# Patient Record
Sex: MALE | Race: WHITE | Employment: OTHER | URBAN - METROPOLITAN AREA
[De-identification: names, ages, dates, MRNs, and addresses within clinical notes are randomized per-mention and may not be internally consistent; named-entity substitution may affect disease eponyms.]

---

## 2021-01-01 ENCOUNTER — HOSPITAL ENCOUNTER (OUTPATIENT)
Dept: PHYSICAL THERAPY | Age: 56
Discharge: HOME | End: 2021-08-11
Payer: COMMERCIAL

## 2021-01-01 ENCOUNTER — HOSPITAL ENCOUNTER (OUTPATIENT)
Dept: PHYSICAL THERAPY | Age: 56
End: 2021-01-01
Payer: COMMERCIAL

## 2021-01-01 ENCOUNTER — HOSPITAL ENCOUNTER (OUTPATIENT)
Dept: PHYSICAL THERAPY | Age: 56
Discharge: HOME | End: 2021-07-26
Payer: COMMERCIAL

## 2021-01-01 PROCEDURE — 92526 ORAL FUNCTION THERAPY: CPT

## 2021-01-01 PROCEDURE — 92610 EVALUATE SWALLOWING FUNCTION: CPT

## 2021-06-15 PROBLEM — Z98.890 STATUS POST NECK DISSECTION: Status: ACTIVE | Noted: 2021-01-01

## 2021-06-22 PROBLEM — Z48.89 POSTOPERATIVE OBSERVATION: Status: ACTIVE | Noted: 2021-01-01

## 2021-06-23 PROBLEM — E07.9 THYROID MASS: Status: ACTIVE | Noted: 2021-01-01

## 2021-07-26 NOTE — PROGRESS NOTES
[facility] [address], [address] [facility] 83,[address] 200   [place], 70 [facility]  [phone] phone  [phone] fax               Plan of Care/ Statement of Necessity for Speech Therapy Services     Patient name: Hali Vargas Start of Care: 2021   Referral source: Rita Phillip MD : 1965    Medical Diagnosis: Dysphagia [R13.10]  Payor: Juliet Junior / Plan: Jesse Nazario / Product Type: HMO /  Onset Date: 21    Treatment Diagnosis: R 13.13   Prior Hospitalization: see medical history Provider#: [id number]   Medications: Verified on Patient summary List    Comorbidities: allergic rhinitis; HTN; Diabetes; Sleep Apnea; sinusitis; R Neck mass; s/p neck dissection and total thyroidectomy d/t Metastatic papillary Ca of thyroid    Prior Level of Function: All aspects of speech/ language, cognition previously reported WNLS; change in voice noted prior to surgery and dysphagia c/b globus sensation with solids prior to surgery, per pt report    The Plan of Care and following information is based on the information from the initial evaluation. Assessment/ key information: This 62 y/o male was referred to OP ST by ENT s/p R neck mass excision 6/15/21 and total thyroidectomy on 21d/t metastatic papillary Ca. Pt reports increasing dysphagia  (solids/ globus) and change in voice prior to surgery. Per chart review, s/p surgery there were concerns of RLN injury and pt warranted re-intubation d/t laryngeal spasm. He was seen by acute SLP at bedside on 21 in which pt presented with mild-moderate pharyngeal dysphagia. He is scheduled to have an MBS completed on 21 at [address] [place] [address].     Today a formal bedside swallow evaluation was conducted per MD trevor/tx orders. Pt reports both swallowing and voice are a concern, but would like to address swallowing first. Pt is scheduled for voice eval on 21. Pt A&Ox4 and able to follow commands, as well as pertinent PMHX. Speech/voice: speech intact, but with moderate-severe dysphonia c/b breathiness, hoarseness and intermittent stidor. Pt stated, \"I know it sounds like I can't breathe, but I'm fine, It just sounds like it\". Oral mech examination revealed structures functional for speech and deglutition. Pt observed with thin and nectar/mildly thick liquids via single sips  with timely swallow initiation, but immediate coughing. Initially, improved tolerance presented with Nectar thick liquids when utilizing chin tuck and double swallow, however, I suspect pharyngeal residues, as after 3rd swallow increasing immediate and delayed cough occurring. Head turn was also attempted, but worsened dysphagia symptoms. Pt consumed honey/moderately thick liquids  With double swallow demo'ing no overt s/sx of aspiration. He demo'd tolerance of pudding with timely oral prep and swallow initiation. + rotary chew and thorough oral clearance was observed with Regular  solids; delayed throat clearing noted s/p swallow of solids. Across al consistencies, pt exhibited significantly reduced laryngeal elevation to palpation, and benefited from double swallows. Based on clinical findings, Pt exhibits a moderate pharyngeal dysphagia and is at risk for aspiration. Pt would further benefit from an MBS to further assess pharyngeal swallow, which is scheduled for 8/5/21      Pt educated with regard to s/sx aspiration, aspiration risk, diet recs, comp strategies, purpose/benefit of MBS, how to thicken liquids and benefit, positioning,  and role of SLP. Pt able to verbalize understanding.      SLP RECS:  IDDIS 6 and 3: Soft/Bite sized solids with Honey/ moderately thick liquids using slow rate and double swallows with standard aspiration precautions.    - MBS to be completed  -Voice eval to be completed   -initiate ST    Problem List:      []aphasic  []dysarthric  [x]dysphagic       []alexic  []agraphic  [x]dysphonia       []dysfluency []Cognitive-Linguistic Disorder       []other   Treatment Plan may include any combination of the following: Dysphagia Treatment, Voice Treatment, Treatment of Swallowing and Patient Education      Patient / Family readiness to learn indicated by: asking questions, trying to perform skills and interest    Persons(s) to be included in education:   patient (P)    Barriers to Learning/Limitations: yes;  physical    Patient Goal (s): improve QOL    Patient Self Reported Health Status: good    Rehabilitation Potential: good    Short Term Goals: To be accomplished in 4 weeks  1. Patient will complete laryngeal exercises (including Suzy maneuver, Mendelsohn maneuver, modified Shaker with CTAR ball, hard /k/ in isolation, effortful swallow, supraglottic swallow, and super supraglottic swallow,etc.), with min cues in 3/3 sessions in order to improve the strength/ agility/efficiency of his swallow to decrease laryngeal stasis and risk for aspiration. 2. Patient will recall/demonstrate aspiration precautions and safe swallowing strategies with 100% acc when provided with Bridgette in 3/3 sessions (small sips/bites; double swallow;  slow rate; oral care 2-3x daily; Sit upright at 90 degree angle during po trials and for 30 minute post po intake)  to decrease the reported incidences of dysphagia and s/sx of aspiration. 3. Patient will tolerate trials of  Nectar thick liquids using comp strategies without s/sx of aspiration/ penetration when provided with Bridgette in 3/3 sessions. 4.The patient will correctly thicken liquids to honey/moderately thick viscosity with 100% acc I in 3/3 sessions to reduce risk for aspiration PNA. 5. Patient will recall/ demonstrate elements warranted to participate in [facility] Protocol with aggressive oral care in 3/3 sessions in order to implement in conjunction with restorative swallowing exercises to improve QOL.     6. Pt will participate in objective modified barium swallow study to establish baseline and furhter assess pharyngeal swallow. Long Term Goals: To be accomplished in 4 weeks   1. Patient will consume a  Soft/bite sized and Nectar thick liquid diet utilizing compensatory swallowing strategies with yeni in 4/5 trials  without s/sx of aspiration/ penetration, as confirmed by MBS to promote QOL, enhance hydration/ nutrition status, and reduce risk of aspiration. Frequency / Duration: Patient to be seen  2 times per week for 4 weeks:  Complete Dysphonia eval; MBS to be completed    Patient/ Caregiver education and instruction: Diagnosis, prognosis, Swallowing Precautions, Diet Recs, Purpose of MBS, Compensatory Techniques and Pt/SLP mutually establish POC. Certification Period: 7/26/21--8/23/21    ANGELINA Méndez 7/26/2021 10:16 AM  MA, CCC-SLP  Speech-Language Pathologist    ________________________________________________________________________    I certify that the above Therapy Services are being furnished while the patient is under my care. I agree with the treatment plan and certify that this therapy is necessary.     Physician's Signature:____________Date:_________TIME:________    [employer], Date and Time must be completed for valid certification **      Please sign and return to In [address] [place] [address]  [phone] phone  [phone] fax     Thank you

## 2021-07-26 NOTE — PROGRESS NOTES
ST DAILY TREATMENT NOTE    Patient Name: Paz Mendoza  Date:2021  : 1965  [x]  Patient  Verified  Payor: Rosi Escobedo / Plan: Elsy Carmen / Product Type: HMO /   In time:8:45  Out time: 932  Total Treatment Time (min): 52  Visit #: 1 of 8    SUBJECTIVE  Pain Level (0-10 scale): 0    Any medication changes, allergies to medications, adverse drug reactions, diagnosis change, or new procedure performed?: [] No    [] Yes (see summary sheet for update)  Subjective functional status/changes:   [] No changes reported  This 64 y/o male was referred to OP ST by ENT s/p R neck mass excision 6/15/21 and total thyroidectomy on 21d/t metastatic papillary Ca. Pt reports increasing dysphagia  (solids/ globus) and change in voice prior to surgery. Per chart review, s/p surgery there were concerns of RLN injury and pt warranted re-intubation d/t laryngeal spasm. He was seen by acute SLP at bedside on 21 in which pt presented with mild-moderate pharyngeal dysphagia. He is scheduled to have an MBS completed on 21 at [facility].    Date of Onset: 21  Social History: ; has 2 daughters who reside with him. 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 Prior Functional level: All aspects of speech/ language, cognition previously reported WNLS; change in voice noted prior to surgery and dysphagia c/b globus sensation with solids prior to surgery, per pt report      Radiology: MBS planned for 21 @ [facility]  Current diet: Regular/ thin    positionin  Mental Status:  [x]alert []lethargic []confused  Orientation: []person []place []time [x]situation  [facility] Directions: []1-step [x]2-step [x]3-step [x]complex  Motivation: []excellent []good  []fair  []poor   Barriers to learning: [x]none []aphasia []? cognition []lethargy/motivation [][place]   []?vision []language []Fatigue/pain []psych factors compensate with:   Dentition: [x]normal []abnormal []edentulous []dentures   Respiratory Status: [x]WFL []SOB []O2 L/min:  []NC []Mask               []Trach Tube:                     []Excess secretions  Lips:  [x]Symmetrical []asymmetrical  Retraction [x]WFL  []?min []?mod []?max  Protrusion [x]WFL  []?min []?mod []?max  Strength [x]WFL  []?min []?mod []?max  Puff  []WFL  []?min []?mod []?max      Tongue:  [x]Symmetrical []asymmetrical  Protrusion [x]WFL  []?min []?mod []?max  Elevation [x]WFL  []?min []?mod []?max  Depression [x]WFL  []?min []?mod []?max  Lateralization [x]WFL  []?min []?mod []?max  Strength [x]WFL  []?min []?mod []?max    Velum:  [x]Symmetrical []asymmetrical  Gag Reflex:  []Present []absent [x]DNT  Sensation:  []Intact [x]Diminished  Specify: suspected       Voice:  []Normal [x]Hoarse []harsh  [x]Breathy []Hypernasal []hyponasal  []Gurgly Other:   Swallow:  [x]Volitional []absent  [x]Reflexive []absent  Cough   Strength : []WNL [x]Diminished  [x]Volitional []absent  [x]Reflexive []absent        OBJECTIVE    OP SWALLOW EVALUATION    Observation of Swallow:  Thin Nectar/  Mildly Honey/  Moderately Pudding   Solids   Other  Mixed consistency canned fruit cocktail   Oral Phase      DNT   Anterior loss of bolus  (decreased labial seal [])         Decreased bolus formation  (?lingual ROM/Coord[])         Increased mastication         Increased oral transit time  (?A-P bolus transit[])         Abnormal chewing         Tongue pumping/tremors         Pocketing  (?labial/buccal tension/lingual control)         Other         Oral Pharyngeal Phase         Delayed swallow initiation         Absent swallow         Stasis on lingual surface  (?lingual movement)         Adherence to hard palate   (? lingual elevation)         Pharyngeal Phase         Multiple swallows  (residue in pharynx []) X X X X X    Coughing post swallow X X   +/- chin tuck  & head turn           Throat clear post swallow         Wet vocal quality  (residue on vocal cords [])         Reduced laryngeal elevation X X X X X    Nasal Regurgitation  (? velopharyngeal closure)         Other           [] No symptoms of dysphagia evidenced  [x] Symptoms of dysphagia observed  [x] Patient at risk for aspiration  [] Other:     Recommendations:  Diet:  [] NPO    [] Pureed [] Minced/Moist  [x] Soft/Bite Sized  [] Easy to Chew [] Regular  Liquids: [] Water  [] Thin [] Slightly Thick [] Mildly Thick/Nectar  [x] Moderately Thick/Honey  [] Extremely Thick/pudding  Honeythick    Presentation: [x] Cup    [x] Spoon [] Straw [] Alternate liquids and solids  Monitor: [x] Sitting up at 90 deg [] Reclined to:  [] Head turned to:    [] Chin tuck  [] Head tilt to:      [x] Seated upright post meals (min):> 30  Document: [x] Coughing [x] Temperatures [] Lung Sounds    Videoflouroscopy: [x] Yes [] No  Dysphagia Treatment: [x] Yes [] No Sessions per week: 2x weekly   Other:    Remediation Techniques:  C = Compensatory techniques to use during meal      F = Facilitation/treatment techniques by SLP    [x] Supraglottic Swallow (c,f)    [] Oral motor exercises (f)  [x] Super-supraglottic swallow (c,f)   [] Labial closure  [] Compensations for pocketing (c)   [] Lingual elevation  [] Sweep mouth with tongue    [] Lingual lateralization  [] Sweep mouth with finger    [] Lingual anterior-posterior  [] External pressure to check   [x] Lingual base of tongue  [] Rinse mouth/expel after meal   [x] Vocal Fold Exercises (f)  [] Alternate liquid swallows every _ bites (c)  [x] Falsetto/laryngeal elevation exercises (f)  [] Discourage liquid wash between bites (c)  [] Thermal application (c,f)  [x] Multiple swallows     [] Sour bolus (f)  [x] Patient needs cues     [] Cold bolus (f)  [] Patient does not need cues   [x] Pharyngeal exercise (f)  [x] Mendelsohn Maneuver (c,f)    [] Breath hold  [] Encourage/stimulate lip closure (c)   [x] Effortful Swallow (c,f)  [] Empty mouth before next bite (c)   [] Tongue base retraction  [x] Cue patient to slow down (c)    [x] Tongue hold  [x] Encourage coughing (c)    [x] Laryngeal closure  []Chin tuck (c)  []Head turn  (c)  []Head turn, chin tuck (c)    Patient/Caregiver instruction/education: [x] Review HEP    [] Progressed/Changed    HEP/Handouts given:  IDDSI 3 and 6 handouts;  Thicken Up Clear Handout    Pain Level (0-10 scale) post treatment: 0  ASSESSMENT  [x]  See Plan of Care       PLAN  [x]  Upgrade activities as tolerated     [x]  Continue plan of care  []  Discharge due to:__  [x] Other: Tx initiated with pt education and handouts provided    ANGELINA Ross 7/26/2021  8:56 AM  MA, [id number] [facility]  Speech-Language Pathologist

## 2021-08-11 NOTE — PROGRESS NOTES
ST DAILY TREATMENT NOTE    Patient Name: Rubi Ramos  Date:2021  : 1965  [x]  Patient  Verified  Payor: Payor: DUNIA STARK / Plan: Jen Lazo / Product Type: HMO /   In time:850 Out time:930   Total Treatment Time (min): 40  Visit #: 2 of 8    Treatment Diagnosis: Dysphagia [R13.10]    SUBJECTIVE  Pain Level (0-10 scale): 0  Any medication changes, allergies to medications, adverse drug reactions, diagnosis change, or new procedure performed?: [] No    [x] Yes (see summary sheet for update):   *21: Oral Motor Assessment: No facial asymmetry at rest.  Natural dentition in poor condition with numerous missing teeth. Lingual ROM/strength WFL on the left/right. Labial ROM WFL on left/right for protrusion and retraction.      Motor Speech/Voice Production: Connected speech was adequate for rate, rhythm, and precision. However, vocal quality was breathy. Comprehensibility was judged 70% by unfamiliar listener. In addition, patient demonstrated a stridor when breathing. ENT reported stated that patient had bilateral vocal cord paralysis in the paramedian position.     Procedure: The study was completed with the patient in the lateral plane. The following consistencies were assessed: thin liquid via cup; nectar thick liquid via cup; honey thick liquid via cup; puree; soft solid; solid.      Oral Phase:   Bolus acceptance and retrieval were satisfactory. No consistent anterior loss occurred. Bolus cohesion was intact. Mastication was mildly prolonged, however bolus preparation and transport were effective in clearing oral cavity completely. No stasis accumulated along the lingual dorsum.     Pharyngeal Phase:  Swallow onset was timely , however incomplete epiglottic inversion, and diminished hyolaryngeal elevation were evident. No retention accumulated within the pharyngeal recesses.   Due to reduced hyolaryngeal elevation  and incomplete epiglottic seal, aspiration occurred with thin liquid and laryngeal penetration with nectar thick liquid. Patient did have a delayed cough in response to this airway violation. No aspiration or penetration was observed with trials of honey thick liquid, puree, soft solid and solid consistencies during this MBSS.      Esophageal Phase: In lateral plane for non-diagnostic screen of esophagus, no deficits were apparent. Please see radiologists report for further details.      Impressions: Moderate to severe pharyngeal dysphagia c/b diminished magnitude of pharyngeal response. Dysphagia resulted in aspiration of thin liquid via cup and laryngeal penetration with nectar thick liquid. Based upon this MBSS, recommend Regular Diet with Honey Thick Liquid as tolerated by patient. Patient will benefit from ST f/u for dysphagia tx targeting tongue base retraction, hyolaryngeal elevation, and pharyngeal strengthening as well respiratory retraining. Recommend repeat MBSS in 4-6 weeks per clinical gains.      RECOMMENDATIONS:     1. Diet: Regular and Honey-Thick as tolerated by patient     2. Aspiration Precautions: Awake and alert, May feed self independently, Seated upright, Slow rate, Small bites/sips, No straws, Remain upright at least 30 minutes after meals, If not contraindicated, crush meds and give with puree and Meticulous oral care     3. Outpatient ST for dysphgia     4. Monitor closely for s.s of aspiration or worsening dysphagia. Immediatly alert physician if patient exhibits changes in swallowing. *8/10/21: Dr Santiago Odell- Pt seen in office for f/u. Pt reports MD will likely have a trach placed. He presents today with much more audible breathing. He has a f/u appointment in office  8/16 @ 830.           Subjective functional status/changes:   [] No changes reported  Pt had MBS completed on 8/5/21   Pt demo'd  increased stridor and breathiness    OBJECTIVE  Treatment provided includes:  Increase/Improve:  []  Voice Quality []  Cognitive Linguistic Skills [x]  Laryngeal/Pharyngeal Exercises   []  Vocal Loudness []  Reading Comprehension [x]  Swallowing Skills    []  Vocal Cord Function []  Auditory Comprehension []  Oral Motor Skills   []  Resonance []  Writing Skills [x]  Compensatory strategies    []  Speech Intelligibility []  Expressive Language []  Attention   []  Breath Support/Coord. []  Receptive language []  Memory   []  Articulation []  Safety Awareness [x] pt education    []  Fluency []  Word Retrieval []        Treatment Provided:    -pt education: reviewed results of MBS/ answered any/all questions  -laryngeal ex  -initiate HEP    Patient/Caregiver  Education: [x] Review HEP      HEP/Handouts given: laryngeal ex HEP- mendelsohn    Pain Level (0-10 scale) post treatment: 0    ASSESSMENT     []   Improving appropriately and progressing toward goals  [x]   Improving slowly and progressing toward goals  []   Approximating goals/maximum potential  [x]   Continues to benefit from skilled therapy to address remaining functional deficits  []   Not progressing toward goals and plan of care will be adjusted    Patient will continue to benefit from skilled therapy to address remaining functional deficits: dysphagia    Progress towards goals / Updated goals:  1. Patient will complete laryngeal exercises (including Landon Resides, modified Shaker with CTAR ball, hard /k/ in isolation, effortful swallow, supraglottic swallow, and super supraglottic swallow,etc.), with min cues in 3/3 sessions in order to improve the strength/ agility/efficiency of his swallow to decrease laryngeal stasis and risk for aspiration. Current 8/11/21: Max skilled instructions/modeling/feedback to attempt Mendelsohn maneuver with 0/10 achieved. Hard /k/ words x25 with min-mod cues/reps for acc     2.  Patient will recall/demonstrate aspiration precautions and safe swallowing strategies with 100% acc when provided with Bridgette in 3/3 sessions (small sips/bites; double swallow;  slow rate; oral care 2-3x daily; Sit upright at 90 degree angle during po trials and for 30 minute post po intake)  to decrease the reported incidences of dysphagia and s/sx of aspiration.    Current 8/11/21: Not targeted this date    3. Patient will tolerate trials of nectar thick liquids using comp strategies without s/sx of aspiration/ penetration when provided with Bridgette in 3/3 sessions. Current 8/11/21: Not targeted this date    4. The patient will correctly thicken liquids to honey/moderately thick viscosity with 100% acc I in 3/3 sessions to reduce risk for aspiration PNA. Current 8/11/21: Pt reports he is using thick it regularly at home, however, pt reports It hard to get it just right, he will bring in next session.       5. Patient will recall/ demonstrate elements warranted to participate in [place] free Water Protocol with aggressive oral care in 3/3 sessions in order to implement in conjunction with restorative swallowing exercises to improve QOL. Current 8/11/21: Not targeted this date     6. Pt will participate in objective modified barium swallow study to establish baseline and furhter assess pharyngeal swallow. Current 8/11/21: Completed.  See above     PLAN  [x]  Continue plan of care  []  Modify Goals/Treatment Plan      []  Discharge due to:  [x] Other: pt will cxl 8/16 appt d/t f/u with ENT for potential trach    Lucio Dvaalos SLP 8/11/2021  9:35 AM  MA, CCC-SLP  Speech-Language Pathologist    Future Appointments   Date Time Provider Tisha Watson   8/11/2021  8:45 AM [address] [facility]   8/16/2021  8:45 AM [address] [facility]   8/18/2021  8:45 AM [address] [facility]   8/23/2021  9:30 AM [address] [facility]   8/25/2021  9:30 AM [address] [facility]   8/30/2021  9:30 AM [address] [facility]   9/1/2021  9:30 AM Greg

## 2021-08-12 PROBLEM — I26.99 PULMONARY EMBOLISM (HCC): Status: ACTIVE | Noted: 2021-01-01

## 2021-08-12 PROBLEM — I46.9 CARDIAC ARREST (HCC): Status: ACTIVE | Noted: 2021-01-01

## 2021-08-12 PROBLEM — R57.0 CARDIOGENIC SHOCK (HCC): Status: ACTIVE | Noted: 2021-01-01

## 2021-08-18 ENCOUNTER — APPOINTMENT (OUTPATIENT)
Dept: PHYSICAL THERAPY | Age: 56
End: 2021-08-18
Payer: COMMERCIAL

## 2021-08-18 NOTE — PROGRESS NOTES
[facility] [address] [place] [address] , [address] [place] [address]  Phone: [phone]             Fax: [phone]     Speech Therapy Discharge Summary    Patient name: Paz Mendoza Start of Care: 21   Referral source: Ed Cornelius MD : [id number]   Medical/Treatment Diagnosis: Dysphagia [R13.10]  Payor: BLUE [employer]OSS / Plan: [employer] / Product Type: HMO /  Onset Date:21     Prior Hospitalization: see medical history Provider#: [id number]   Medications: Verified on Patient Summary List    Comorbidities: allergic rhinitis; HTN; Diabetes; Sleep Apnea; sinusitis; R Neck mass; s/p neck dissection and total thyroidectomy d/t Metastatic papillary Ca of thyroid    Prior Level of Function: All aspects of speech/ language, cognition previously reported WNLS; change in voice noted prior to surgery and dysphagia c/b globus sensation with solids prior to surgery, per pt report    Visits from Start of Care: 2    Missed Visits: 0    Reporting Period : 21 to 21    Summary of Care:  Goal: 1. Patient will complete laryngeal exercises (including Suzy maneuver, Mendelsohn maneuver, modified Shaker with CTAR ball, hard /k/ in isolation, effortful swallow, supraglottic swallow, and super supraglottic swallow,etc.), with min cues in 3/3 sessions in order to improve the strength/ agility/efficiency of his swallow to decrease laryngeal stasis and risk for aspiration.   Status at last note/certification: At eval, 21, pt exhibited moderate pharyngeal dysphagia  Status at discharge: not met; Max skilled instructions/modeling/feedback to attempt Mendelsohn maneuver with 0/10 achieved. Hard /k/ words x25 with min-mod cues/reps for acc    Goal: 2. Patient will recall/demonstrate aspiration precautions and safe swallowing strategies with 100% acc when provided with Bridgette in 3/3 sessions (small sips/bites; double swallow;  slow rate; oral care 2-3x daily;  Sit upright at 90 degree angle during po trials and for 30 minute post po intake)  to decrease the reported incidences of dysphagia and s/sx of aspiration. Status at last note/certification:At eval, 7/26/21, pt exhibited moderate pharyngeal dysphagia  Status at discharge:not met; Reviewed results of MBS, however, pt only seen x1 session following eval prior demise; unable to further address    Goal: 3. Patient will tolerate trials of nectar thick liquids using comp strategies without s/sx of aspiration/ penetration when provided with Bridgette in 3/3 sessions. Status at last note/certification:At eval, 7/26/21, pt exhibited moderate pharyngeal dysphagia  Status at discharge:not met; pt seen x1 session following eval prior demise    Goal: 4. The patient will correctly thicken liquids to honey/moderately thick viscosity with 100% acc I in 3/3 sessions to reduce risk for aspiration PNA. Status at last note/certification: At eval, 7/26/21, pt exhibited moderate pharyngeal dysphagia  Status at discharge: not met; Pt reports he is using thick it regularly at home, however, pt reports It hard to get it just right, he will bring in next session. ; pt seen x1 session following eval prior demise    Goal: 5. Patient will recall/ demonstrate elements warranted to participate in Coyle free Water Protocol with aggressive oral care in 3/3 sessions in order to implement in conjunction with restorative swallowing exercises to improve QOL. tatus at last note/certification: At [facility], 7/26/21, pt exhibited moderate pharyngeal dysphagia  Status at discharge: not met; pt seen x1 session following eval prior demise    Goal: 6. Pt will participate in objective modified barium swallow study to establish baseline and furhter assess pharyngeal swallow. Status at last note/certification: At [facility], 7/26/21, pt exhibited moderate pharyngeal dysphagia  Status at discharge: met  Completed.      ASSESSMENT: SLP d/c'ing pt; will sign off    RECOMMENDATIONS:  [x]Discontinue therapy: []Patient has reached or is progressing toward set goals      []Patient is non-compliant or has abdicated      []Due to lack of appreciable progress towards set goals         Other: ; s/p cardiac arrest     Nickie [place], SLP 2021 7:55 AM  [place], [address]

## 2021-08-23 ENCOUNTER — APPOINTMENT (OUTPATIENT)
Dept: PHYSICAL THERAPY | Age: 56
End: 2021-08-23
Payer: COMMERCIAL

## 2021-08-25 ENCOUNTER — APPOINTMENT (OUTPATIENT)
Dept: PHYSICAL THERAPY | Age: 56
End: 2021-08-25
Payer: COMMERCIAL

## 2021-08-30 ENCOUNTER — APPOINTMENT (OUTPATIENT)
Dept: PHYSICAL THERAPY | Age: 56
End: 2021-08-30
Payer: COMMERCIAL

## 2021-09-01 ENCOUNTER — APPOINTMENT (OUTPATIENT)
Dept: PHYSICAL THERAPY | Age: 56
End: 2021-09-01